# Patient Record
Sex: MALE | HISPANIC OR LATINO | Employment: FULL TIME | URBAN - METROPOLITAN AREA
[De-identification: names, ages, dates, MRNs, and addresses within clinical notes are randomized per-mention and may not be internally consistent; named-entity substitution may affect disease eponyms.]

---

## 2018-05-31 ENCOUNTER — TELEPHONE (OUTPATIENT)
Dept: VASCULAR LAB | Facility: MEDICAL CENTER | Age: 33
End: 2018-05-31

## 2018-05-31 ENCOUNTER — ANTICOAGULATION MONITORING (OUTPATIENT)
Dept: VASCULAR LAB | Facility: MEDICAL CENTER | Age: 33
End: 2018-05-31

## 2018-05-31 RX ORDER — NICOTINE 21 MG/24HR
1 PATCH, TRANSDERMAL 24 HOURS TRANSDERMAL EVERY 24 HOURS
Qty: 30 PATCH | Refills: 6 | Status: SHIPPED | OUTPATIENT
Start: 2018-05-31 | End: 2019-03-20

## 2018-05-31 RX ORDER — VARENICLINE TARTRATE 1 MG/1
1 TABLET, FILM COATED ORAL 2 TIMES DAILY
Qty: 60 TAB | Refills: 3 | Status: SHIPPED | OUTPATIENT
Start: 2018-05-31 | End: 2019-03-20

## 2018-05-31 NOTE — PROGRESS NOTES
Outpatient Pharmacotherapy Program    Smoking Cessation Note    Reason for Visit: Patient presents for smoking cessation pharmacotherapy  Initial visit  HPI:    Age at Which Started Smokin  Average number of cigarettes smoked per day: 1/ ppd x17 years  Additional Smoking Hx:   Pertinent Psych Hx: none  Recent quit attempts:  6 years ago, he became ill URI, quit smoking for 8 months, then restarted on his last visit to [facility]  Medications reviewed.      Vitals:  BP:             HR:       Assessment:    Tobacco use disorder, willing to make quit attempt with a combination of pharmacological and behavioral therapy.    Plan:    Behavioral Modification Recommended: Specifically smoking cessation class - [facility]    Quit Date: 2018    Nicotine Replacement Therapy  Nicoderm 14mg apply one patch every morning then remove at bedtime    Chantix            Starter pack        Potential side-effects of all prescribed pharmacotherapy reviewed with patient who verbalizes understanding of the risks and benefits of this therapy.    Follow-up 2018    Farnaz Mandujano

## 2018-06-15 ENCOUNTER — TELEPHONE (OUTPATIENT)
Dept: VASCULAR LAB | Facility: MEDICAL CENTER | Age: 33
End: 2018-06-15

## 2018-06-15 NOTE — TELEPHONE ENCOUNTER
Prior authorization for Chantix denied  Will scan documents into media tab  Will ask clinical pharmacist to assess options    Michael J. Bloch, MD  [facility]

## 2019-03-20 ENCOUNTER — APPOINTMENT (OUTPATIENT)
Dept: RADIOLOGY | Facility: MEDICAL CENTER | Age: 34
DRG: 872 | End: 2019-03-20
Attending: EMERGENCY MEDICINE
Payer: COMMERCIAL

## 2019-03-20 ENCOUNTER — HOSPITAL ENCOUNTER (INPATIENT)
Facility: MEDICAL CENTER | Age: 34
LOS: 2 days | DRG: 872 | End: 2019-03-22
Attending: EMERGENCY MEDICINE | Admitting: HOSPITALIST
Payer: COMMERCIAL

## 2019-03-20 ENCOUNTER — OFFICE VISIT (OUTPATIENT)
Dept: URGENT CARE | Facility: CLINIC | Age: 34
End: 2019-03-20
Payer: COMMERCIAL

## 2019-03-20 VITALS
HEART RATE: 96 BPM | WEIGHT: 190 LBS | BODY MASS INDEX: 29.82 KG/M2 | HEIGHT: 67 IN | OXYGEN SATURATION: 97 % | TEMPERATURE: 98.5 F | DIASTOLIC BLOOD PRESSURE: 76 MMHG | RESPIRATION RATE: 18 BRPM | SYSTOLIC BLOOD PRESSURE: 108 MMHG

## 2019-03-20 DIAGNOSIS — K04.7 DENTAL ABSCESS: ICD-10-CM

## 2019-03-20 DIAGNOSIS — K04.7 DENTAL INFECTION: ICD-10-CM

## 2019-03-20 DIAGNOSIS — R22.0 RIGHT FACIAL SWELLING: ICD-10-CM

## 2019-03-20 DIAGNOSIS — K08.89 PAIN, DENTAL: ICD-10-CM

## 2019-03-20 PROBLEM — K11.20 SIALOADENITIS OF SUBMANDIBULAR GLAND: Status: ACTIVE | Noted: 2019-03-20

## 2019-03-20 PROBLEM — F17.200 SMOKER: Status: ACTIVE | Noted: 2019-03-20

## 2019-03-20 PROBLEM — A41.9 SEPSIS (HCC): Status: ACTIVE | Noted: 2019-03-20

## 2019-03-20 LAB
ALBUMIN SERPL BCP-MCNC: 4.4 G/DL (ref 3.2–4.9)
ALBUMIN/GLOB SERPL: 1.3 G/DL
ALP SERPL-CCNC: 87 U/L (ref 30–99)
ALT SERPL-CCNC: 18 U/L (ref 2–50)
ANION GAP SERPL CALC-SCNC: 7 MMOL/L (ref 0–11.9)
AST SERPL-CCNC: 21 U/L (ref 12–45)
BASOPHILS # BLD AUTO: 0.2 % (ref 0–1.8)
BASOPHILS # BLD: 0.04 K/UL (ref 0–0.12)
BILIRUB SERPL-MCNC: 0.7 MG/DL (ref 0.1–1.5)
BUN SERPL-MCNC: 9 MG/DL (ref 8–22)
CALCIUM SERPL-MCNC: 9.4 MG/DL (ref 8.5–10.5)
CHLORIDE SERPL-SCNC: 104 MMOL/L (ref 96–112)
CO2 SERPL-SCNC: 24 MMOL/L (ref 20–33)
CREAT SERPL-MCNC: 0.92 MG/DL (ref 0.5–1.4)
EOSINOPHIL # BLD AUTO: 0.05 K/UL (ref 0–0.51)
EOSINOPHIL NFR BLD: 0.3 % (ref 0–6.9)
ERYTHROCYTE [DISTWIDTH] IN BLOOD BY AUTOMATED COUNT: 42.4 FL (ref 35.9–50)
GLOBULIN SER CALC-MCNC: 3.5 G/DL (ref 1.9–3.5)
GLUCOSE SERPL-MCNC: 89 MG/DL (ref 65–99)
HCT VFR BLD AUTO: 47.1 % (ref 42–52)
HGB BLD-MCNC: 15.8 G/DL (ref 14–18)
IMM GRANULOCYTES # BLD AUTO: 0.08 K/UL (ref 0–0.11)
IMM GRANULOCYTES NFR BLD AUTO: 0.5 % (ref 0–0.9)
LYMPHOCYTES # BLD AUTO: 2.34 K/UL (ref 1–4.8)
LYMPHOCYTES NFR BLD: 13.4 % (ref 22–41)
MCH RBC QN AUTO: 30.7 PG (ref 27–33)
MCHC RBC AUTO-ENTMCNC: 33.5 G/DL (ref 33.7–35.3)
MCV RBC AUTO: 91.6 FL (ref 81.4–97.8)
MONOCYTES # BLD AUTO: 1.66 K/UL (ref 0–0.85)
MONOCYTES NFR BLD AUTO: 9.5 % (ref 0–13.4)
NEUTROPHILS # BLD AUTO: 13.28 K/UL (ref 1.82–7.42)
NEUTROPHILS NFR BLD: 76.1 % (ref 44–72)
NRBC # BLD AUTO: 0 K/UL
NRBC BLD-RTO: 0 /100 WBC
PLATELET # BLD AUTO: 248 K/UL (ref 164–446)
PMV BLD AUTO: 9.5 FL (ref 9–12.9)
POTASSIUM SERPL-SCNC: 4 MMOL/L (ref 3.6–5.5)
PROT SERPL-MCNC: 7.9 G/DL (ref 6–8.2)
RBC # BLD AUTO: 5.14 M/UL (ref 4.7–6.1)
SODIUM SERPL-SCNC: 135 MMOL/L (ref 135–145)
WBC # BLD AUTO: 17.5 K/UL (ref 4.8–10.8)

## 2019-03-20 PROCEDURE — 700105 HCHG RX REV CODE 258: Performed by: EMERGENCY MEDICINE

## 2019-03-20 PROCEDURE — 700101 HCHG RX REV CODE 250: Performed by: EMERGENCY MEDICINE

## 2019-03-20 PROCEDURE — 99406 BEHAV CHNG SMOKING 3-10 MIN: CPT | Performed by: HOSPITALIST

## 2019-03-20 PROCEDURE — 700101 HCHG RX REV CODE 250: Performed by: HOSPITALIST

## 2019-03-20 PROCEDURE — 70355 PANORAMIC X-RAY OF JAWS: CPT

## 2019-03-20 PROCEDURE — 700102 HCHG RX REV CODE 250 W/ 637 OVERRIDE(OP): Performed by: HOSPITALIST

## 2019-03-20 PROCEDURE — 700111 HCHG RX REV CODE 636 W/ 250 OVERRIDE (IP): Performed by: EMERGENCY MEDICINE

## 2019-03-20 PROCEDURE — 770006 HCHG ROOM/CARE - MED/SURG/GYN SEMI*

## 2019-03-20 PROCEDURE — 700117 HCHG RX CONTRAST REV CODE 255: Performed by: EMERGENCY MEDICINE

## 2019-03-20 PROCEDURE — 99223 1ST HOSP IP/OBS HIGH 75: CPT | Mod: 25 | Performed by: HOSPITALIST

## 2019-03-20 PROCEDURE — 700111 HCHG RX REV CODE 636 W/ 250 OVERRIDE (IP): Performed by: HOSPITALIST

## 2019-03-20 PROCEDURE — 70487 CT MAXILLOFACIAL W/DYE: CPT

## 2019-03-20 PROCEDURE — 85025 COMPLETE CBC W/AUTO DIFF WBC: CPT

## 2019-03-20 PROCEDURE — A9270 NON-COVERED ITEM OR SERVICE: HCPCS | Performed by: HOSPITALIST

## 2019-03-20 PROCEDURE — 99203 OFFICE O/P NEW LOW 30 MIN: CPT | Performed by: PHYSICIAN ASSISTANT

## 2019-03-20 PROCEDURE — 700105 HCHG RX REV CODE 258: Performed by: HOSPITALIST

## 2019-03-20 PROCEDURE — 80053 COMPREHEN METABOLIC PANEL: CPT

## 2019-03-20 RX ORDER — DEXAMETHASONE SODIUM PHOSPHATE 4 MG/ML
10 INJECTION, SOLUTION INTRA-ARTICULAR; INTRALESIONAL; INTRAMUSCULAR; INTRAVENOUS; SOFT TISSUE ONCE
Status: COMPLETED | OUTPATIENT
Start: 2019-03-20 | End: 2019-03-20

## 2019-03-20 RX ORDER — ONDANSETRON 2 MG/ML
4 INJECTION INTRAMUSCULAR; INTRAVENOUS ONCE
Status: COMPLETED | OUTPATIENT
Start: 2019-03-20 | End: 2019-03-20

## 2019-03-20 RX ORDER — KETOROLAC TROMETHAMINE 30 MG/ML
30 INJECTION, SOLUTION INTRAMUSCULAR; INTRAVENOUS EVERY 6 HOURS PRN
Status: DISCONTINUED | OUTPATIENT
Start: 2019-03-20 | End: 2019-03-22

## 2019-03-20 RX ORDER — PROMETHAZINE HYDROCHLORIDE 12.5 MG/1
12.5-25 SUPPOSITORY RECTAL EVERY 4 HOURS PRN
Status: DISCONTINUED | OUTPATIENT
Start: 2019-03-20 | End: 2019-03-22 | Stop reason: HOSPADM

## 2019-03-20 RX ORDER — DEXAMETHASONE SODIUM PHOSPHATE 4 MG/ML
6 INJECTION, SOLUTION INTRA-ARTICULAR; INTRALESIONAL; INTRAMUSCULAR; INTRAVENOUS; SOFT TISSUE EVERY 6 HOURS
Status: DISCONTINUED | OUTPATIENT
Start: 2019-03-20 | End: 2019-03-22 | Stop reason: HOSPADM

## 2019-03-20 RX ORDER — ONDANSETRON 4 MG/1
4 TABLET, ORALLY DISINTEGRATING ORAL EVERY 4 HOURS PRN
Status: DISCONTINUED | OUTPATIENT
Start: 2019-03-20 | End: 2019-03-22 | Stop reason: HOSPADM

## 2019-03-20 RX ORDER — SODIUM CHLORIDE 9 MG/ML
1000 INJECTION, SOLUTION INTRAVENOUS ONCE
Status: COMPLETED | OUTPATIENT
Start: 2019-03-20 | End: 2019-03-20

## 2019-03-20 RX ORDER — SODIUM CHLORIDE 9 MG/ML
INJECTION, SOLUTION INTRAVENOUS CONTINUOUS
Status: DISCONTINUED | OUTPATIENT
Start: 2019-03-20 | End: 2019-03-22

## 2019-03-20 RX ORDER — AMOXICILLIN 250 MG
2 CAPSULE ORAL 2 TIMES DAILY
Status: DISCONTINUED | OUTPATIENT
Start: 2019-03-20 | End: 2019-03-22 | Stop reason: HOSPADM

## 2019-03-20 RX ORDER — BISACODYL 10 MG
10 SUPPOSITORY, RECTAL RECTAL
Status: DISCONTINUED | OUTPATIENT
Start: 2019-03-20 | End: 2019-03-22 | Stop reason: HOSPADM

## 2019-03-20 RX ORDER — POLYETHYLENE GLYCOL 3350 17 G/17G
1 POWDER, FOR SOLUTION ORAL
Status: DISCONTINUED | OUTPATIENT
Start: 2019-03-20 | End: 2019-03-22 | Stop reason: HOSPADM

## 2019-03-20 RX ORDER — MORPHINE SULFATE 4 MG/ML
4 INJECTION, SOLUTION INTRAMUSCULAR; INTRAVENOUS ONCE
Status: COMPLETED | OUTPATIENT
Start: 2019-03-20 | End: 2019-03-20

## 2019-03-20 RX ORDER — CEPHALEXIN 500 MG/1
500 CAPSULE ORAL 4 TIMES DAILY
Status: ON HOLD | COMMUNITY
End: 2019-03-22

## 2019-03-20 RX ORDER — PROMETHAZINE HYDROCHLORIDE 25 MG/1
12.5-25 TABLET ORAL EVERY 4 HOURS PRN
Status: DISCONTINUED | OUTPATIENT
Start: 2019-03-20 | End: 2019-03-22 | Stop reason: HOSPADM

## 2019-03-20 RX ORDER — CLINDAMYCIN PHOSPHATE 900 MG/50ML
900 INJECTION, SOLUTION INTRAVENOUS ONCE
Status: COMPLETED | OUTPATIENT
Start: 2019-03-20 | End: 2019-03-20

## 2019-03-20 RX ORDER — TRAMADOL HYDROCHLORIDE 50 MG/1
50 TABLET ORAL EVERY 6 HOURS PRN
Status: DISCONTINUED | OUTPATIENT
Start: 2019-03-20 | End: 2019-03-22 | Stop reason: HOSPADM

## 2019-03-20 RX ORDER — CLINDAMYCIN PHOSPHATE 600 MG/50ML
600 INJECTION, SOLUTION INTRAVENOUS EVERY 8 HOURS
Status: DISCONTINUED | OUTPATIENT
Start: 2019-03-20 | End: 2019-03-21

## 2019-03-20 RX ORDER — ONDANSETRON 2 MG/ML
4 INJECTION INTRAMUSCULAR; INTRAVENOUS EVERY 4 HOURS PRN
Status: DISCONTINUED | OUTPATIENT
Start: 2019-03-20 | End: 2019-03-22 | Stop reason: HOSPADM

## 2019-03-20 RX ORDER — ACETAMINOPHEN 325 MG/1
650 TABLET ORAL EVERY 6 HOURS PRN
Status: DISCONTINUED | OUTPATIENT
Start: 2019-03-20 | End: 2019-03-22 | Stop reason: HOSPADM

## 2019-03-20 RX ADMIN — DEXAMETHASONE SODIUM PHOSPHATE 6 MG: 4 INJECTION, SOLUTION INTRAMUSCULAR; INTRAVENOUS at 18:02

## 2019-03-20 RX ADMIN — TRAMADOL HYDROCHLORIDE 50 MG: 50 TABLET, FILM COATED ORAL at 18:02

## 2019-03-20 RX ADMIN — ONDANSETRON 4 MG: 2 INJECTION INTRAMUSCULAR; INTRAVENOUS at 14:21

## 2019-03-20 RX ADMIN — IOHEXOL 80 ML: 350 INJECTION, SOLUTION INTRAVENOUS at 14:05

## 2019-03-20 RX ADMIN — PHENOL 1 SPRAY: 1.5 LIQUID ORAL at 18:02

## 2019-03-20 RX ADMIN — SODIUM CHLORIDE 1000 ML: 9 INJECTION, SOLUTION INTRAVENOUS at 14:23

## 2019-03-20 RX ADMIN — DEXAMETHASONE SODIUM PHOSPHATE 10 MG: 4 INJECTION, SOLUTION INTRA-ARTICULAR; INTRALESIONAL; INTRAMUSCULAR; INTRAVENOUS; SOFT TISSUE at 15:53

## 2019-03-20 RX ADMIN — CLINDAMYCIN IN 5 PERCENT DEXTROSE 600 MG: 12 INJECTION, SOLUTION INTRAVENOUS at 21:04

## 2019-03-20 RX ADMIN — DEXAMETHASONE SODIUM PHOSPHATE 6 MG: 4 INJECTION, SOLUTION INTRAMUSCULAR; INTRAVENOUS at 23:20

## 2019-03-20 RX ADMIN — KETOROLAC TROMETHAMINE 30 MG: 30 INJECTION, SOLUTION INTRAMUSCULAR; INTRAVENOUS at 17:10

## 2019-03-20 RX ADMIN — MORPHINE SULFATE 4 MG: 4 INJECTION INTRAVENOUS at 14:21

## 2019-03-20 RX ADMIN — SODIUM CHLORIDE: 9 INJECTION, SOLUTION INTRAVENOUS at 17:10

## 2019-03-20 RX ADMIN — ACETAMINOPHEN 650 MG: 325 TABLET, FILM COATED ORAL at 16:53

## 2019-03-20 RX ADMIN — CLINDAMYCIN IN 5 PERCENT DEXTROSE 900 MG: 18 INJECTION, SOLUTION INTRAVENOUS at 14:24

## 2019-03-20 RX ADMIN — KETOROLAC TROMETHAMINE 30 MG: 30 INJECTION, SOLUTION INTRAMUSCULAR; INTRAVENOUS at 23:19

## 2019-03-20 RX ADMIN — PHENOL 1 SPRAY: 1.5 LIQUID ORAL at 21:10

## 2019-03-20 ASSESSMENT — ENCOUNTER SYMPTOMS
DIARRHEA: 0
EYE PAIN: 0
PALPITATIONS: 0
BLURRED VISION: 0
TINGLING: 0
PALPITATIONS: 0
HEADACHES: 0
NECK PAIN: 0
SINUS PAIN: 0
DOUBLE VISION: 0
COUGH: 0
DIZZINESS: 0
CHILLS: 1
CONSTIPATION: 0
BACK PAIN: 0
STRIDOR: 0
BLURRED VISION: 0
ORTHOPNEA: 0
SINUS PAIN: 1
NECK PAIN: 1
HEMOPTYSIS: 0
HEARTBURN: 0
ABDOMINAL PAIN: 0
CHILLS: 1
SINUS PRESSURE: 1
SPUTUM PRODUCTION: 0
DEPRESSION: 0
FEVER: 1
WEAKNESS: 0
NAUSEA: 0
MYALGIAS: 0
SORE THROAT: 1
FEVER: 1
HEADACHES: 0
VOMITING: 0
DIZZINESS: 0
ABDOMINAL PAIN: 0
BRUISES/BLEEDS EASILY: 0
NAUSEA: 0
MYALGIAS: 0
VOMITING: 0

## 2019-03-20 ASSESSMENT — COPD QUESTIONNAIRES
HAVE YOU SMOKED AT LEAST 100 CIGARETTES IN YOUR ENTIRE LIFE: YES
DURING THE PAST 4 WEEKS HOW MUCH DID YOU FEEL SHORT OF BREATH: NONE/LITTLE OF THE TIME
DO YOU EVER COUGH UP ANY MUCUS OR PHLEGM?: NO/ONLY WITH OCCASIONAL COLDS OR INFECTIONS
COPD SCREENING SCORE: 2

## 2019-03-20 ASSESSMENT — COGNITIVE AND FUNCTIONAL STATUS - GENERAL
DAILY ACTIVITIY SCORE: 24
MOBILITY SCORE: 24
SUGGESTED CMS G CODE MODIFIER MOBILITY: CH
SUGGESTED CMS G CODE MODIFIER DAILY ACTIVITY: CH

## 2019-03-20 ASSESSMENT — LIFESTYLE VARIABLES
ALCOHOL_USE: NO
EVER_SMOKED: NEVER
EVER_SMOKED: YES

## 2019-03-20 ASSESSMENT — PATIENT HEALTH QUESTIONNAIRE - PHQ9
2. FEELING DOWN, DEPRESSED, IRRITABLE, OR HOPELESS: NOT AT ALL
SUM OF ALL RESPONSES TO PHQ9 QUESTIONS 1 AND 2: 0
1. LITTLE INTEREST OR PLEASURE IN DOING THINGS: NOT AT ALL

## 2019-03-20 NOTE — ED TRIAGE NOTES
Chief Complaint   Patient presents with   • Dental Pain     Pt reports dental pain/swelling on Mon, pt now with increased swelling/ pain to right side jaw/neck. pt having a difficult time swallowing/controlling secretions.    • Oral Swelling   • Difficulty Swallowing   • Neck Swelling     Explained to pt triage process, made pt aware to tell this RN/staff of any changes/concerns, pt verbalized understanding of process and instructions given. Pt to ER lobby.

## 2019-03-20 NOTE — PROGRESS NOTES
Subjective:      Jorden Moses is a 33 y.o. male who presents with Oral Swelling (X2 days Right side mouth swelling)      Dental Pain    This is a new problem. The current episode started in the past 7 days. The problem occurs constantly. The problem has been rapidly worsening. The pain is severe. Associated symptoms include difficulty swallowing, facial pain, a fever and sinus pressure. Treatments tried: Keflex. The treatment provided no relief.   Patient reports that he has an issue with his right molar for which his dentist put him on Keflex for days ago.  He says that swelling and pain has been rapidly worsening, however now he is unable to open his mouth.  He had subjective fever yesterday and his wife said he was having chills in conjunction with it.  He says that his neck is also becoming very painful mainly in the front.      Review of Systems   Constitutional: Positive for chills, fever and malaise/fatigue.   HENT: Positive for sinus pain and sinus pressure.         Dental pain, facial pain   Eyes: Negative for blurred vision and pain.   Cardiovascular: Negative for chest pain and palpitations.   Gastrointestinal: Negative for abdominal pain, constipation, diarrhea, nausea and vomiting.   Musculoskeletal: Positive for neck pain. Negative for myalgias.   Neurological: Negative for dizziness and headaches.       PMH:  has a past medical history of Open wound of finger (4/6/2015).  MEDS:   Current Outpatient Prescriptions:   •  cephALEXin (KEFLEX) 500 MG Cap, Take 500 mg by mouth 4 times a day., Disp: , Rfl:   •  varenicline (CHANTIX CONTINUING MONTH TONIA) 1 MG tablet, Take 1 Tab by mouth 2 times a day. (Patient not taking: Reported on 3/20/2019), Disp: 60 Tab, Rfl: 3  •  nicotine (NICODERM CQ) 14 MG/24HR PATCH 24 HR, Apply 1 Patch to skin as directed every 24 hours. (Patient not taking: Reported on 3/20/2019), Disp: 30 Patch, Rfl: 6  •  sucralfate (CARAFATE) 1 GM Tab, Take 1 Tab by mouth 4 Times a  "Day,Before Meals and at Bedtime. take one hour before meals and at bedtime.  Take it for 3 days then as needed for pain.  Can dissolve in water (3oz) before ingestion (Patient not taking: Reported on 3/20/2019), Disp: 20 Tab, Rfl: 0  •  oxycodone-acetaminophen (PERCOCET) 5-325 MG TABS, Take 1-2 Tabs by mouth every four hours as needed. (Patient not taking: Reported on 3/20/2019), Disp: 20 Tab, Rfl: 0  ALLERGIES: No Known Allergies  SURGHX: History reviewed. No pertinent surgical history.  SOCHX:  reports that he has been smoking.  He has never used smokeless tobacco. He reports that he drinks alcohol. He reports that he does not use drugs.  FH: Family history was reviewed, no pertinent findings to report     Objective:     /76 (BP Location: Left arm, Patient Position: Sitting, BP Cuff Size: Adult)   Pulse 96   Temp 36.9 °C (98.5 °F) (Temporal)   Resp 18   Ht 1.702 m (5' 7\")   Wt 86.2 kg (190 lb)   SpO2 97%   BMI 29.76 kg/m²      Physical Exam   Constitutional: He is oriented to person, place, and time. He appears well-developed and well-nourished.   HENT:   Head: Normocephalic and atraumatic.   Right Ear: External ear normal.   Left Ear: External ear normal.   Mouth/Throat: There is trismus in the jaw. Abnormal dentition. Dental abscesses present.   Patient is unable to fully open his mouth due to pain and swelling.  There is significant swelling of the right cheek extending into the right side of the neck.   Eyes: Pupils are equal, round, and reactive to light. Conjunctivae are normal.   Neck: Normal range of motion.   Cardiovascular: Normal rate, regular rhythm and normal heart sounds.    No murmur heard.  Pulmonary/Chest: Effort normal and breath sounds normal. He has no wheezes.   Lymphadenopathy:     He has no cervical adenopathy.   Neurological: He is alert and oriented to person, place, and time.   Skin: Skin is warm and dry. Capillary refill takes less than 2 seconds.   Psychiatric: He has a " normal mood and affect. His behavior is normal. Judgment normal.   Vitals reviewed.         Assessment/Plan:     1. Dental infection    2. Pain, dental    3. Right facial swelling      Discussed with patient that due to how much swelling there is and how rapidly he is progressing, with the fact that he is unable to open his mouth for examination I believe he warrants further evaluation in the emergency department for CT scan and potential ENT or oral surgeon consultation.  Advised patient to go to [facility].  He is stable at time of discharge from the urgent care.  His wife is going to drive him by private vehicle.  Attempted to reach the care coordinator at the hospital but no one answered.

## 2019-03-20 NOTE — H&P
[facility] Medicine History & Physical Note    Date of Service  3/20/2019    Primary Care Physician  Pcp Pt States None    Consultants  None    Code Status  Full code    Chief Complaint  Swelling of right neck    History of Presenting Illness  33 y.o. male who presented 3/20/2019 with no significant past medical history, is coming today complaining of swelling on his right neck started around 3 days ago, and beginning patient thought that he had some tooth infection, but swelling got worse patient developed malaise patient was seen as outpatient and he was prescribed NSAIDs and Keflex but did not work, patient came to the emergency room today had CT showingRight submandibular sialoadenitis without abscess, patient was started on IV clindamycin and IV Decadron, patient when I saw him he was in the emergency room, he was able to speak but has pain with speaking and swallowing, he denies any shortness of breath, no chest pain palpitation, he is able to drink fluids, family is at bedside, denies any abdominal pain nausea vomiting diarrhea or constipation, patient denies any neck pain, headaches, no vision problems, he is alert oriented follows commands and he is able to give history, patient expressed understanding of his plan of care and agree with it, all questions answered.    Review of Systems  Review of Systems   Constitutional: Positive for chills and fever.   HENT: Positive for sore throat. Negative for congestion and sinus pain.    Eyes: Negative for blurred vision and double vision.   Respiratory: Negative for cough, hemoptysis, sputum production and stridor.    Cardiovascular: Negative for chest pain, palpitations and orthopnea.   Gastrointestinal: Negative for abdominal pain, heartburn, nausea and vomiting.   Genitourinary: Negative for dysuria, frequency and urgency.   Musculoskeletal: Negative for back pain, myalgias and neck pain.   Skin: Negative for itching.   Neurological: Negative for dizziness,  tingling, weakness and headaches.   Endo/Heme/Allergies: Does not bruise/bleed easily.   Psychiatric/Behavioral: Negative for depression and suicidal ideas.       Past Medical History   has a past medical history of Open wound of finger (4/6/2015).    Surgical History  No recent surgeries    Family History  Family history reviewed, noncontributory    Social History   reports that he has been smoking.  He has never used smokeless tobacco. He reports that he drinks alcohol. He reports that he does not use drugs.    Allergies  No Known Allergies    Medications  Prior to Admission Medications   Prescriptions Last Dose Informant Patient Reported? Taking?   cephALEXin (KEFLEX) 500 MG Cap 3/20/2019 at 0400  Yes No   Sig: Take 500 mg by mouth 4 times a day.      Facility-Administered Medications: None       Physical Exam  Temp:  [36.3 °C (97.4 °F)] 36.3 °C (97.4 °F)  Pulse:  [98] 98  Resp:  [12] 12  BP: (109)/(77) 109/77  SpO2:  [96 %] 96 %    Physical Exam   Constitutional: He is oriented to person, place, and time. He appears well-nourished. No distress.   HENT:   Head: Atraumatic. Macrocephalic.   Swelling on right submandibular area, mild tenderness on palpation, no crepitus, no stridor.   Eyes: Pupils are equal, round, and reactive to light. Conjunctivae are normal. Right eye exhibits no discharge. Left eye exhibits no discharge. No scleral icterus.   Neck: Normal range of motion. Neck supple.   Swelling on right submandibular area   Cardiovascular: Regular rhythm and normal heart sounds.    Pulmonary/Chest: Effort normal and breath sounds normal. No stridor. No respiratory distress. He has no wheezes. He has no rales.   Abdominal: Soft. Bowel sounds are normal. He exhibits no distension. There is no tenderness. There is no rebound.   Musculoskeletal: Normal range of motion. He exhibits no tenderness.   Lymphadenopathy:     He has cervical adenopathy.   Neurological: He is alert and oriented to person, place, and time.  He exhibits normal muscle tone.   Skin: Skin is dry. No erythema.   Psychiatric: He has a normal mood and affect.   Nursing note and vitals reviewed.      Laboratory:  Recent Labs      03/20/19   1330   WBC  17.5*   RBC  5.14   HEMOGLOBIN  15.8   HEMATOCRIT  47.1   MCV  91.6   MCH  30.7   MCHC  33.5*   RDW  42.4   PLATELETCT  248   MPV  9.5     Recent Labs      03/20/19   1330   SODIUM  135   POTASSIUM  4.0   CHLORIDE  104   CO2  24   GLUCOSE  89   BUN  9   CREATININE  0.92   CALCIUM  9.4     Recent Labs      03/20/19   1330   ALTSGPT  18   ASTSGOT  21   ALKPHOSPHAT  87   TBILIRUBIN  0.7   GLUCOSE  89                 No results for input(s): TROPONINI in the last 72 hours.    Urinalysis:    No results found     Imaging:  CT-MAXILLOFACIAL WITH PLUS RECONS   Final Result      Right submandibular sialoadenitis without abscess      [id number]   Final Result      No acute fracture of the mandible.            Assessment/Plan:  I anticipate this patient will require at least two midnights for appropriate medical management, necessitating inpatient admission.    Smoker- (present on admission)   Assessment & Plan    Patient advised and counseled regarding smoking cessation, patient is stated that he is willing to quit at this time, he was advised about different alternatives that can help him quitting.  Spent more than 3 minutes counseling patient.     Sepsis (HCC)- (present on admission)   Assessment & Plan    This is sepsis (without associated acute organ dysfunction). due to Right submandibular sialoadenitis without abscess, continue IV antibiotics, follow-up blood cultures, IV fluids.     Sialoadenitis of submandibular gland- (present on admission)   Assessment & Plan    Right-sided, CT showed Right submandibular sialoadenitis without abscess, at this time airway is patent but will need close monitoring, patient is able to speak and swallow, denies any shortness of breath, will continue on IV steroids, IV  antibiotics, follow-up cultures, pending on evolution will consider ENT consult.         VTE prophylaxis: SCDs

## 2019-03-20 NOTE — ED PROVIDER NOTES
"ED Provider Note      CHIEF COMPLAINT  Chief Complaint   Patient presents with   • Dental Pain     Pt reports dental pain/swelling on Mon, pt now with increased swelling/ pain to right side jaw/neck. pt having a difficult time swallowing/controlling secretions.    • Oral Swelling   • Difficulty Swallowing   • Neck Swelling       HPI  Jorden Moses is a 33 y.o. male who presents with dental pain. Pain started 3 days ago. Pain is located right jaw. Pain is severe. Constant. It is getting worse. No fevers. No neck stiffness or headache. Swelling has progressed and now it is under the tongue.  It is difficult for him to open and close the mouth.  Difficult to swallow.    REVIEW OF SYSTEMS    See HPI, all other systems reviewed and negative    PAST MEDICAL HISTORY  Past Medical History:   Diagnosis Date   • Open wound of finger 4/6/2015       SOCIAL HISTORY  Social History   Substance Use Topics   • Smoking status: Current Every Day Smoker   • Smokeless tobacco: Never Used   • Alcohol use Yes      Comment: occasionally       ALLERGIES  No Known Allergies    PHYSICAL EXAM  VITAL SIGNS: /77   Pulse 98   Temp 36.3 °C (97.4 °F) (Temporal)   Resp 12   Ht 1.702 m (5' 7\")   Wt 87 kg (191 lb 12.8 oz)   SpO2 96%   BMI 30.04 kg/m²   Constitutional: Well developed, Well nourished, No acute distress, Non-toxic appearance.   HENT:  Atraumatic, Normocephalic. Bilateral external ears normal, immune swelling over the right mandible and submental space.  There is swelling underneath the tongue intraorally.  Tenderness over the right gingiva without discrete dental carry  Eyes: Grossly Normal inspection  Neck: Normal range of motion  Cardiovascular: Normal heart rate  Thorax & Lungs: No respiratory distress  Skin: No rash.     COURSE & MEDICAL DECISION MAKING  Patient with swelling underneath the right jaw and  submental space concerning for Isaias's angina.  An IV was established.  He is given intravenous " clindamycin.  Laboratory data was ordered and a maxillofacial CT scan along with panoramic x-ray was ordered.    X-ray returned negative    Laboratory data as noted    CT scan demonstrates right-sided sialoadenitis.  Given the degree of swelling I still believe patient requires admission to the hospital for IV antibiotics.  We will try to obtain sialagogues.  I have ordered Decadron 10 mg IV.  Considered otolaryngology consultation however there is no abscess or need for immediate surgical intervention.  I consulted the hospitalist who is agreeable to admission.    FINAL IMPRESSION  1.  Right submandibular sialoadenitis  2.  Submental and sub-lingual edema associated with #1      This dictation was created using voice recognition software. The accuracy of the dictation is limited to the abilities of the software.   The nursing notes were reviewed and certain aspects of this information were incorporated into this note.      Electronically signed by: Jd Alonso, 3/20/2019 1:36 PM

## 2019-03-20 NOTE — ED NOTES
Med rec updated and complete.  Allergies reviewed.  Pt is currently on a antibiotic   Keflex 03/19/19 ( start date).  Last dose 0400 on 03/20/19.

## 2019-03-20 NOTE — ED NOTES
Patient stating that pain in his jaw is only slightly improved after morphine, and that his headache I still at the same pain level

## 2019-03-21 LAB
ANION GAP SERPL CALC-SCNC: 11 MMOL/L (ref 0–11.9)
BUN SERPL-MCNC: 10 MG/DL (ref 8–22)
CALCIUM SERPL-MCNC: 9.1 MG/DL (ref 8.5–10.5)
CHLORIDE SERPL-SCNC: 108 MMOL/L (ref 96–112)
CO2 SERPL-SCNC: 21 MMOL/L (ref 20–33)
CREAT SERPL-MCNC: 0.74 MG/DL (ref 0.5–1.4)
ERYTHROCYTE [DISTWIDTH] IN BLOOD BY AUTOMATED COUNT: 42.8 FL (ref 35.9–50)
GLUCOSE SERPL-MCNC: 141 MG/DL (ref 65–99)
HCT VFR BLD AUTO: 44.7 % (ref 42–52)
HGB BLD-MCNC: 14.6 G/DL (ref 14–18)
MCH RBC QN AUTO: 30.6 PG (ref 27–33)
MCHC RBC AUTO-ENTMCNC: 32.7 G/DL (ref 33.7–35.3)
MCV RBC AUTO: 93.7 FL (ref 81.4–97.8)
PLATELET # BLD AUTO: 243 K/UL (ref 164–446)
PMV BLD AUTO: 9.8 FL (ref 9–12.9)
POTASSIUM SERPL-SCNC: 4.1 MMOL/L (ref 3.6–5.5)
RBC # BLD AUTO: 4.77 M/UL (ref 4.7–6.1)
SODIUM SERPL-SCNC: 140 MMOL/L (ref 135–145)
WBC # BLD AUTO: 14.4 K/UL (ref 4.8–10.8)

## 2019-03-21 PROCEDURE — 80048 BASIC METABOLIC PNL TOTAL CA: CPT

## 2019-03-21 PROCEDURE — 700101 HCHG RX REV CODE 250: Performed by: HOSPITALIST

## 2019-03-21 PROCEDURE — A9270 NON-COVERED ITEM OR SERVICE: HCPCS | Performed by: HOSPITALIST

## 2019-03-21 PROCEDURE — 700111 HCHG RX REV CODE 636 W/ 250 OVERRIDE (IP): Performed by: INTERNAL MEDICINE

## 2019-03-21 PROCEDURE — 99285 EMERGENCY DEPT VISIT HI MDM: CPT

## 2019-03-21 PROCEDURE — 700105 HCHG RX REV CODE 258: Performed by: HOSPITALIST

## 2019-03-21 PROCEDURE — 96365 THER/PROPH/DIAG IV INF INIT: CPT

## 2019-03-21 PROCEDURE — 85027 COMPLETE CBC AUTOMATED: CPT

## 2019-03-21 PROCEDURE — 700102 HCHG RX REV CODE 250 W/ 637 OVERRIDE(OP): Performed by: HOSPITALIST

## 2019-03-21 PROCEDURE — 700105 HCHG RX REV CODE 258: Performed by: INTERNAL MEDICINE

## 2019-03-21 PROCEDURE — 700111 HCHG RX REV CODE 636 W/ 250 OVERRIDE (IP): Performed by: HOSPITALIST

## 2019-03-21 PROCEDURE — 36415 COLL VENOUS BLD VENIPUNCTURE: CPT

## 2019-03-21 PROCEDURE — 770006 HCHG ROOM/CARE - MED/SURG/GYN SEMI*

## 2019-03-21 PROCEDURE — 96375 TX/PRO/DX INJ NEW DRUG ADDON: CPT

## 2019-03-21 PROCEDURE — 99232 SBSQ HOSP IP/OBS MODERATE 35: CPT | Performed by: INTERNAL MEDICINE

## 2019-03-21 RX ADMIN — DEXAMETHASONE SODIUM PHOSPHATE 6 MG: 4 INJECTION, SOLUTION INTRAMUSCULAR; INTRAVENOUS at 04:46

## 2019-03-21 RX ADMIN — CLINDAMYCIN IN 5 PERCENT DEXTROSE 600 MG: 12 INJECTION, SOLUTION INTRAVENOUS at 04:46

## 2019-03-21 RX ADMIN — TRAMADOL HYDROCHLORIDE 50 MG: 50 TABLET, FILM COATED ORAL at 04:44

## 2019-03-21 RX ADMIN — PHENOL 1 SPRAY: 1.5 LIQUID ORAL at 04:48

## 2019-03-21 RX ADMIN — SODIUM CHLORIDE: 9 INJECTION, SOLUTION INTRAVENOUS at 04:45

## 2019-03-21 RX ADMIN — KETOROLAC TROMETHAMINE 30 MG: 30 INJECTION, SOLUTION INTRAMUSCULAR; INTRAVENOUS at 21:12

## 2019-03-21 RX ADMIN — DEXAMETHASONE SODIUM PHOSPHATE 6 MG: 4 INJECTION, SOLUTION INTRAMUSCULAR; INTRAVENOUS at 17:47

## 2019-03-21 RX ADMIN — AMPICILLIN SODIUM AND SULBACTAM SODIUM 3 G: 2; 1 INJECTION, POWDER, FOR SOLUTION INTRAMUSCULAR; INTRAVENOUS at 17:47

## 2019-03-21 RX ADMIN — DEXAMETHASONE SODIUM PHOSPHATE 6 MG: 4 INJECTION, SOLUTION INTRAMUSCULAR; INTRAVENOUS at 12:54

## 2019-03-21 RX ADMIN — AMPICILLIN SODIUM AND SULBACTAM SODIUM 3 G: 2; 1 INJECTION, POWDER, FOR SOLUTION INTRAMUSCULAR; INTRAVENOUS at 12:55

## 2019-03-21 RX ADMIN — SODIUM CHLORIDE: 9 INJECTION, SOLUTION INTRAVENOUS at 17:48

## 2019-03-21 ASSESSMENT — ENCOUNTER SYMPTOMS
NAUSEA: 0
WEAKNESS: 1
COUGH: 0
SPEECH CHANGE: 0
MEMORY LOSS: 0
FLANK PAIN: 0
FOCAL WEAKNESS: 0
VOMITING: 0
MYALGIAS: 1
DEPRESSION: 0
ABDOMINAL PAIN: 0
DIZZINESS: 0
NERVOUS/ANXIOUS: 0
DIAPHORESIS: 0
SENSORY CHANGE: 0
HEADACHES: 0
SHORTNESS OF BREATH: 0
PALPITATIONS: 0
BACK PAIN: 0
CHILLS: 0
FEVER: 0

## 2019-03-21 ASSESSMENT — PATIENT HEALTH QUESTIONNAIRE - PHQ9
1. LITTLE INTEREST OR PLEASURE IN DOING THINGS: NOT AT ALL
SUM OF ALL RESPONSES TO PHQ9 QUESTIONS 1 AND 2: 0
2. FEELING DOWN, DEPRESSED, IRRITABLE, OR HOPELESS: NOT AT ALL

## 2019-03-21 NOTE — PROGRESS NOTES
"Bedside report received. Family members at bedside, pt is A&Ox4, 2 RN skin check was completed. Pt educated regarding medications IV clindamycin and IV Decadron. Pt denies need for pain medication at this time, PRN medications Tramadol and Toradol were administered per MAR. \"pt stated that medication really took care of the pain\" Pt was offered chloraseptic oral throat spray for soar throat. POC discussed with pt; all questions answered at this time.   "

## 2019-03-21 NOTE — ASSESSMENT & PLAN NOTE
This is sepsis (without associated acute organ dysfunction). due to Right submandibular sialoadenitis without abscess, continue IV antibiotics, follow-up blood cultures, IV fluids.    3/21 resolving

## 2019-03-21 NOTE — PROGRESS NOTES
· 2 RN skin check complete with YOU Cameron.  · Devices in place N/A.  · Skin assessed under devices N/A.  · Confirmed pressure ulcers found on N/A  · New potential pressure ulcers noted on N/A.  · Skin assessment completed and pt had no areas of concern. All skin is blanching and intact. Pt does have redness and swelling underneath the tongue. The Right mandible is red and tender due to edema. Oral soar throat spray administered to mouth area.

## 2019-03-21 NOTE — PROGRESS NOTES
Assumed care of patient at change of shift. Patient is alert and oriented x 4. R side of face/jaw still swollen but patient states less painful today and has not required any pain medication or throat spray. Advanced diet to full liquid and patient is tolerating well. Wife brought in sour gummy candy for patient to help increase salvia secretions to help with infection. IVF, antibiotics and steroids continue. Will continue to monitor. Safety measures in place. Call light and belongings within reach.

## 2019-03-21 NOTE — CARE PLAN
Problem: Knowledge Deficit  Goal: Knowledge of disease process/condition, treatment plan, diagnostic tests, and medications will improve  Outcome: PROGRESSING AS EXPECTED  Pt educated regarding plan of care and medications. All questions answered.     Problem: Pain Management  Goal: Pain level will decrease to patient's comfort goal  Outcome: PROGRESSING AS EXPECTED  Pt assessed for pain regularly and medicated PRN per MAR.

## 2019-03-21 NOTE — PROGRESS NOTES
[facility] Medicine Daily Progress Note    Date of Service  3/21/2019    Chief Complaint  33 y.o. male admitted 3/20/2019 with history of tobacco use admitted for right sided sialoadenitis, currently on antibiotics and steroids.    Hospital Course    33 y.o. male admitted 3/20/2019 with history of tobacco use admitted for right sided sialoadenitis, currently on antibiotics and steroids.      Interval Problem Update  Improving R mandibular edema and ttp  Able to swallow  No respiratory difficulty    Consultants/Specialty  none    Code Status  Full    Disposition  TBD    Review of Systems  Review of Systems   Constitutional: Negative for chills, diaphoresis, fever and malaise/fatigue.   HENT: Negative for congestion and hearing loss.         Right facial edema, ttp   Respiratory: Negative for cough and shortness of breath.    Cardiovascular: Negative for chest pain, palpitations and leg swelling.   Gastrointestinal: Negative for abdominal pain, nausea and vomiting.   Genitourinary: Negative for dysuria and flank pain.   Musculoskeletal: Positive for myalgias. Negative for back pain and joint pain.   Neurological: Positive for weakness. Negative for dizziness, sensory change, speech change, focal weakness and headaches.   Psychiatric/Behavioral: Negative for depression and memory loss. The patient is not nervous/anxious.         Physical Exam  Temp:  [36.3 °C (97.3 °F)-38.4 °C (101.1 °F)] 36.8 °C (98.3 °F)  Pulse:  [] 96  Resp:  [17-18] 18  BP: (100-117)/(59-75) 103/59  SpO2:  [93 %-98 %] 94 %    Physical Exam   Constitutional: He is oriented to person, place, and time. He appears well-nourished. No distress.   HENT:   Head: Normocephalic and atraumatic.       Nose: Nose normal.   Mouth/Throat: No oropharyngeal exudate.   Eyes: Pupils are equal, round, and reactive to light. EOM are normal. Right eye exhibits no discharge. Left eye exhibits no discharge.   Neck: Neck supple. No JVD present. No thyromegaly present.    Cardiovascular: Normal rate and intact distal pulses.    No murmur heard.  Pulmonary/Chest: Effort normal and breath sounds normal. No respiratory distress. He has no wheezes.   Abdominal: Soft. Bowel sounds are normal. He exhibits no distension. There is no tenderness.   Musculoskeletal: He exhibits no edema or tenderness.   Neurological: He is alert and oriented to person, place, and time. No cranial nerve deficit. He exhibits normal muscle tone. Coordination normal.   Skin: Skin is warm and dry. No rash noted. He is not diaphoretic. No erythema.   Psychiatric: He has a normal mood and affect. His behavior is normal. Judgment and thought content normal.   Nursing note and vitals reviewed.      Fluids    Intake/Output Summary (Last 24 hours) at 03/21/19 1358  Last data filed at 03/21/19 0900   Gross per 24 hour   Intake             2446 ml   Output                0 ml   Net             2446 ml       Laboratory  Recent Labs      03/20/19   1330  03/21/19   0241   WBC  17.5*  14.4*   RBC  5.14  4.77   HEMOGLOBIN  15.8  14.6   HEMATOCRIT  47.1  44.7   MCV  91.6  93.7   MCH  30.7  30.6   MCHC  33.5*  32.7*   RDW  42.4  42.8   PLATELETCT  248  243   MPV  9.5  9.8     Recent Labs      03/20/19   1330  03/21/19   0241   SODIUM  135  140   POTASSIUM  4.0  4.1   CHLORIDE  104  108   CO2  24  21   GLUCOSE  89  141*   BUN  9  10   CREATININE  0.92  0.74   CALCIUM  9.4  9.1                   Imaging  CT-MAXILLOFACIAL WITH PLUS RECONS   Final Result      Right submandibular sialoadenitis without abscess      [id number]   Final Result      No acute fracture of the mandible.           Assessment/Plan  Smoker- (present on admission)   Assessment & Plan    Patient advised and counseled regarding smoking cessation, patient is stated that he is willing to quit at this time, he was advised about different alternatives that can help him quitting.  Spent more than 3 minutes counseling patient.     Sepsis (HCC)- (present on  admission)   Assessment & Plan    This is sepsis (without associated acute organ dysfunction). due to Right submandibular sialoadenitis without abscess, continue IV antibiotics, follow-up blood cultures, IV fluids.    3/21 resolving     Sialoadenitis of submandibular gland- (present on admission)   Assessment & Plan    3/20 Right-sided, CT showed Right submandibular sialoadenitis without abscess, at this time airway is patent but will need close monitoring, patient is able to speak and swallow, denies any shortness of breath, will continue on IV steroids, IV antibiotics, follow-up cultures, pending on evolution will consider ENT consult.    3/21 continue pain control  Leukocytosis improving  We will add sours  ENT consult as needed          VTE prophylaxis: SCD

## 2019-03-21 NOTE — ASSESSMENT & PLAN NOTE
3/20 Right-sided, CT showed Right submandibular sialoadenitis without abscess, at this time airway is patent but will need close monitoring, patient is able to speak and swallow, denies any shortness of breath, will continue on IV steroids, IV antibiotics, follow-up cultures, pending on evolution will consider ENT consult.    3/21 continue pain control  Leukocytosis improving  We will add sours  ENT consult as needed

## 2019-03-22 VITALS
HEIGHT: 67 IN | SYSTOLIC BLOOD PRESSURE: 122 MMHG | RESPIRATION RATE: 18 BRPM | HEART RATE: 75 BPM | DIASTOLIC BLOOD PRESSURE: 79 MMHG | OXYGEN SATURATION: 95 % | BODY MASS INDEX: 30.1 KG/M2 | TEMPERATURE: 97.1 F | WEIGHT: 191.8 LBS

## 2019-03-22 LAB
ANION GAP SERPL CALC-SCNC: 5 MMOL/L (ref 0–11.9)
BASOPHILS # BLD AUTO: 0.1 % (ref 0–1.8)
BASOPHILS # BLD: 0.02 K/UL (ref 0–0.12)
BUN SERPL-MCNC: 14 MG/DL (ref 8–22)
CALCIUM SERPL-MCNC: 8.7 MG/DL (ref 8.5–10.5)
CHLORIDE SERPL-SCNC: 108 MMOL/L (ref 96–112)
CO2 SERPL-SCNC: 24 MMOL/L (ref 20–33)
CREAT SERPL-MCNC: 0.73 MG/DL (ref 0.5–1.4)
EOSINOPHIL # BLD AUTO: 0 K/UL (ref 0–0.51)
EOSINOPHIL NFR BLD: 0 % (ref 0–6.9)
ERYTHROCYTE [DISTWIDTH] IN BLOOD BY AUTOMATED COUNT: 43 FL (ref 35.9–50)
GLUCOSE SERPL-MCNC: 150 MG/DL (ref 65–99)
HCT VFR BLD AUTO: 41.3 % (ref 42–52)
HGB BLD-MCNC: 13.4 G/DL (ref 14–18)
IMM GRANULOCYTES # BLD AUTO: 0.26 K/UL (ref 0–0.11)
IMM GRANULOCYTES NFR BLD AUTO: 1.4 % (ref 0–0.9)
LYMPHOCYTES # BLD AUTO: 1.51 K/UL (ref 1–4.8)
LYMPHOCYTES NFR BLD: 8.3 % (ref 22–41)
MCH RBC QN AUTO: 30.4 PG (ref 27–33)
MCHC RBC AUTO-ENTMCNC: 32.4 G/DL (ref 33.7–35.3)
MCV RBC AUTO: 93.7 FL (ref 81.4–97.8)
MONOCYTES # BLD AUTO: 0.84 K/UL (ref 0–0.85)
MONOCYTES NFR BLD AUTO: 4.6 % (ref 0–13.4)
NEUTROPHILS # BLD AUTO: 15.51 K/UL (ref 1.82–7.42)
NEUTROPHILS NFR BLD: 85.6 % (ref 44–72)
NRBC # BLD AUTO: 0 K/UL
NRBC BLD-RTO: 0 /100 WBC
PLATELET # BLD AUTO: 278 K/UL (ref 164–446)
PMV BLD AUTO: 9.5 FL (ref 9–12.9)
POTASSIUM SERPL-SCNC: 4.6 MMOL/L (ref 3.6–5.5)
RBC # BLD AUTO: 4.41 M/UL (ref 4.7–6.1)
SODIUM SERPL-SCNC: 137 MMOL/L (ref 135–145)
WBC # BLD AUTO: 18.1 K/UL (ref 4.8–10.8)

## 2019-03-22 PROCEDURE — 36415 COLL VENOUS BLD VENIPUNCTURE: CPT

## 2019-03-22 PROCEDURE — 700111 HCHG RX REV CODE 636 W/ 250 OVERRIDE (IP): Performed by: INTERNAL MEDICINE

## 2019-03-22 PROCEDURE — 80048 BASIC METABOLIC PNL TOTAL CA: CPT

## 2019-03-22 PROCEDURE — 700105 HCHG RX REV CODE 258: Performed by: INTERNAL MEDICINE

## 2019-03-22 PROCEDURE — 700111 HCHG RX REV CODE 636 W/ 250 OVERRIDE (IP): Performed by: HOSPITALIST

## 2019-03-22 PROCEDURE — 99239 HOSP IP/OBS DSCHRG MGMT >30: CPT | Performed by: INTERNAL MEDICINE

## 2019-03-22 PROCEDURE — 85025 COMPLETE CBC W/AUTO DIFF WBC: CPT

## 2019-03-22 RX ORDER — PREDNISONE 10 MG/1
10 TABLET ORAL DAILY
Qty: 15 TAB | Refills: 0 | Status: SHIPPED | OUTPATIENT
Start: 2019-03-22

## 2019-03-22 RX ORDER — AMOXICILLIN AND CLAVULANATE POTASSIUM 875; 125 MG/1; MG/1
1 TABLET, FILM COATED ORAL 2 TIMES DAILY
Qty: 20 TAB | Refills: 0 | Status: SHIPPED | OUTPATIENT
Start: 2019-03-22 | End: 2019-04-01

## 2019-03-22 RX ORDER — IBUPROFEN 400 MG/1
600 TABLET ORAL EVERY 6 HOURS PRN
Status: DISCONTINUED | OUTPATIENT
Start: 2019-03-22 | End: 2019-03-22 | Stop reason: HOSPADM

## 2019-03-22 RX ORDER — IBUPROFEN 600 MG/1
600 TABLET ORAL EVERY 6 HOURS PRN
Qty: 30 TAB | Refills: 0 | Status: SHIPPED | OUTPATIENT
Start: 2019-03-22

## 2019-03-22 RX ADMIN — DEXAMETHASONE SODIUM PHOSPHATE 6 MG: 4 INJECTION, SOLUTION INTRAMUSCULAR; INTRAVENOUS at 05:36

## 2019-03-22 RX ADMIN — DEXAMETHASONE SODIUM PHOSPHATE 6 MG: 4 INJECTION, SOLUTION INTRAMUSCULAR; INTRAVENOUS at 00:36

## 2019-03-22 RX ADMIN — AMPICILLIN SODIUM AND SULBACTAM SODIUM 3 G: 2; 1 INJECTION, POWDER, FOR SOLUTION INTRAMUSCULAR; INTRAVENOUS at 05:36

## 2019-03-22 RX ADMIN — AMPICILLIN SODIUM AND SULBACTAM SODIUM 3 G: 2; 1 INJECTION, POWDER, FOR SOLUTION INTRAMUSCULAR; INTRAVENOUS at 00:36

## 2019-03-22 NOTE — PROGRESS NOTES
Pt discharged home. Discharge instructions given and explained to pt. Follow up appointments scheduled. Pt verbalized an understanding of the discharge education.

## 2019-03-22 NOTE — CARE PLAN
Problem: Infection  Goal: Will remain free from infection    Intervention: Assess signs and symptoms of infection  No new s/sx of infection. VSS, afebrile. Facial swelling decreasing per pt. Receiving IV abx and steriods. CTM.      Problem: Pain Management  Goal: Pain level will decrease to patient's comfort goal    Intervention: Follow pain managment plan developed in collaboration with patient and Interdisciplinary Team  Pt complains of mouth pain, medicated per MAR. Pt has prn pain meds, tramadol and toradol available for pain control.

## 2019-03-22 NOTE — DISCHARGE SUMMARY
Discharge Summary    CHIEF COMPLAINT ON ADMISSION  Chief Complaint   Patient presents with   • Dental Pain     Pt reports dental pain/swelling on Mon, pt now with increased swelling/ pain to right side jaw/neck. pt having a difficult time swallowing/controlling secretions.    • Oral Swelling   • Difficulty Swallowing   • Neck Swelling       Reason for Admission  SENT BY MD DENTAL PAIN     Admission Date  3/20/2019    CODE STATUS  Full Code    HPI & HOSPITAL COURSE      33 y.o. male admitted 3/20/2019 with history of tobacco use admitted for right sided sialoadenitis, currently on antibiotics and steroids.  Patient's right sided edema and pain has significantly improved.  He denies any respiratory symptoms.  He is maintaining his airway.  He is tolerating an oral diet.  Patient reports minimal right-sided residual facial edema.  Leukocytosis has minimally increased due to steroid use.  He will be discharged home on an oral course of steroids and antibiotics.  He has an appointment to follow-up with dentistry and 3 days.    Therefore, he is discharged in good and stable condition to home with close outpatient follow-up.    The patient met 2-midnight criteria for an inpatient stay at the time of discharge.    Discharge Date  3/22/2019    FOLLOW UP ITEMS POST DISCHARGE  Primary care provider/discharge clinic in 1 week  Prednisone taper as directed  Augmentin times 10 days.    DISCHARGE DIAGNOSES  Active Problems:    Sialoadenitis of submandibular gland POA: Yes    Sepsis (HCC) POA: Yes    Smoker POA: Yes  Resolved Problems:    * No resolved hospital problems. *      FOLLOW UP  Future Appointments  Date Time Provider Department Center   3/25/2019 1:20 PM [facility] [address]   3/27/2019 7:40 AM Irene Wright M.D. [address]   5/13/2019 2:00 PM Ric Louie M.D. [facility] None     No follow-up provider specified.    MEDICATIONS ON DISCHARGE     Medication List      START taking these  medications      Instructions   amoxicillin-clavulanate 875-125 MG Tabs  Commonly known as:  AUGMENTIN   Take 1 Tab by mouth 2 times a day for 10 days.  Dose:  1 Tab     ibuprofen 600 MG Tabs  Commonly known as:  MOTRIN   Take 1 Tab by mouth every 6 hours as needed for Mild Pain.  Dose:  600 mg     predniSONE 10 MG Tabs  Commonly known as:  DELTASONE   Take 1 Tab by mouth every day. Take 20 mg bid x 2 days, then 20 mg daily x 2 days, then 10 mg daily x 2 days, then 5 mg daily x 2 days then stop.  Dose:  10 mg     sore throat spray 1.4 % Liqd  Commonly known as:  CHLORASEPTIC   Spray 1 Spray in mouth/throat every 2 hours as needed.  Dose:  1 Spray        STOP taking these medications    cephALEXin 500 MG Caps  Commonly known as:  KEFLEX            Allergies  Allergies   Allergen Reactions   • Shrimp (Diagnostic)        DIET  Orders Placed This Encounter   Procedures   • Diet Order Full Liquid     Standing Status:   Standing     Number of Occurrences:   1     Order Specific Question:   Diet:     Answer:   Full Liquid [11]       ACTIVITY  As tolerated.  Weight bearing as tolerated    CONSULTATIONS  None    PROCEDURES  None    LABORATORY  Lab Results   Component Value Date    SODIUM 137 03/22/2019    POTASSIUM 4.6 03/22/2019    CHLORIDE 108 03/22/2019    CO2 24 03/22/2019    GLUCOSE 150 (H) 03/22/2019    BUN 14 03/22/2019    CREATININE 0.73 03/22/2019        Lab Results   Component Value Date    WBC 18.1 (H) 03/22/2019    HEMOGLOBIN 13.4 (L) 03/22/2019    HEMATOCRIT 41.3 (L) 03/22/2019    PLATELETCT 278 03/22/2019        Total time of the discharge process exceeds 40 minutes.

## 2019-03-22 NOTE — DISCHARGE INSTRUCTIONS
Discharge Instructions    Discharged to home by car with relative. Discharged via wheelchair, hospital escort: Yes.  Special equipment needed: Not Applicable    Be sure to schedule a follow-up appointment with your primary care doctor or any specialists as instructed.     Discharge Plan:   Smoking Cessation Offered: Patient Refused  Influenza Vaccine Indication: Patient Refuses    I understand that a diet low in cholesterol, fat, and sodium is recommended for good health. Unless I have been given specific instructions below for another diet, I accept this instruction as my diet prescription.   Other diet: Regular    Special Instructions: None    · Is patient discharged on Warfarin / Coumadin?   No     Salivary Gland Infection  Introduction  A salivary gland infection is an infection in one or more of the glands that produce spit (saliva). You have six major salivary glands. Each gland has a duct that carries saliva into your mouth. Saliva keeps your mouth moist and breaks down the food that you eat. It also helps to prevent tooth decay.  Two salivary glands are located just in front of your ears (parotid). The ducts for these glands open up inside your cheeks, near your back teeth. You also have two glands under your tongue (sublingual) and two glands under your jaw (submandibular). The ducts for these glands open under your tongue. Any salivary gland can become infected. Most infections occur in the parotid glands or submandibular glands.  What are the causes?  Salivary glands can be infected by viruses or bacteria.  · The mumps virus is the most common cause of viral salivary gland infections, though mumps is now rare in many areas because of vaccination.  ¨ This infection causes swelling in both parotid glands.  ¨ Viral infections are more common in children.  · The bacteria that cause salivary gland infections are usually the same bacteria that normally live in your mouth.  ¨ A stone can form in a salivary gland  and block the flow of saliva. As a result, saliva backs up into the salivary gland. Bacteria may then start to grow behind the blockage and cause infection.  ¨ Bacterial infections usually cause pain and swelling on one side of the face. Submandibular gland swelling occurs under the jaw. Parotid swelling occurs in front of the ear.  ¨ Bacterial infections are more common in adults.  What increases the risk?  Children who do not get the MMR (measles, mumps, rubella) vaccine are more likely to get mumps, which can cause a viral salivary gland infection.  Risk factors for bacterial infections include:  · Poor dental care (oral hygiene).  · Smoking.  · Not drinking enough water.  · Having a disease that causes dry mouth and dry eyes (Mikulicz syndrome or Sjogren syndrome).  What are the signs or symptoms?  The main sign of salivary gland infection is a swollen salivary gland. This type of inflammation is often called sialadenitis. You may have swelling in front of your ear, under your jaw, or under your tongue. Swelling may get worse when you eat and decrease after you eat. Other signs and symptoms include:  · Pain.  · Tenderness.  · Redness.  · Dry mouth.  · Bad taste in your mouth.  · Difficulty chewing and swallowing.  · Fever.  How is this diagnosed?  Your health care provider may suspect a salivary gland infection based on your signs and symptoms. He or she will also do a physical exam. The health care provider will look and feel inside your mouth to see whether a stone is blocking a salivary gland duct. You may need to see an ear, nose, and throat specialist (ENT or otolaryngologist) for diagnosis and treatment. You may also need to have diagnostic tests, such as:  · An X-ray to check for a stone.  · Other imaging studies to look for an abscess and to rule out other causes of swelling. These tests may include:  ¨ Ultrasound.  ¨ CT scan.  ¨ MRI.  · Culture and sensitivity test. This involves collecting a sample of  pus for testing in the lab to see what bacteria grow and what antibiotics they are sensitive to. The testing sample may be:  ¨ Swabbed from a salivary gland duct.  ¨ Withdrawn from a swollen gland with a needle (aspiration).  How is this treated?  Viral salivary gland infections usually clear up without treatment. Bacterial infections are usually treated with antibiotic medicine. Severe infections that cause difficulty with swallowing may be treated with an IV antibiotic in the hospital.  Other treatments may include:  · Probing and widening the salivary duct to allow a stone to pass. In some cases, a thin, flexible scope (endoscope) may be inserted into the duct to find a stone and remove it.  · Breaking up a stone using sound waves.  · Draining an infected gland (abscess) with a needle.  · In some cases, you may need surgery so your health care provider can:  ¨ Remove a stone.  ¨ Drain pus from an abscess.  ¨ Remove a badly infected gland.  Follow these instructions at home:  · Take medicines only as directed by your health care provider.  · If you were prescribed an antibiotic medicine, finish it all even if you start to feel better.  · Follow these instructions every few hours:  ¨ Suck on a lemon candy to stimulate the flow of saliva.  ¨ Put a warm compress over the gland.  ¨ Gently massage the gland.  · Drink enough fluid to keep your urine clear or pale yellow.  · Rinse your mouth with a mixture of warm water and salt every few hours. To make this mixture, add a pinch of salt to 1 cup of warm water.  · Practice good oral hygiene by brushing and flossing your teeth after meals and before you go to bed.  · Do not use any tobacco products, including cigarettes, chewing tobacco, or electronic cigarettes. If you need help quitting, ask your health care provider.  Contact a health care provider if:  · You have pain and swelling in your face, jaw, or mouth after eating.  · You have persistent swelling in any of these  places:  ¨ In front of your ear.  ¨ Under your jaw.  ¨ Inside your mouth.  Get help right away if:  · You have pain and swelling in your face, jaw, or mouth that are getting worse.  · Your pain and swelling make it hard to swallow or breathe.  This information is not intended to replace advice given to you by your health care provider. Make sure you discuss any questions you have with your health care provider.  Document Released: 01/25/2006 Document Revised: 05/25/2017 Document Reviewed: 05/20/2015  © 2017 Elsevier    Depression / Suicide Risk    As you are discharged from this [facility] facility, it is important to learn how to keep safe from harming yourself.    Recognize the warning signs:  · Abrupt changes in personality, positive or negative- including increase in energy   · Giving away possessions  · Change in eating patterns- significant weight changes-  positive or negative  · Change in sleeping patterns- unable to sleep or sleeping all the time   · Unwillingness or inability to communicate  · Depression  · Unusual sadness, discouragement and loneliness  · Talk of wanting to die  · Neglect of personal appearance   · Rebelliousness- reckless behavior  · Withdrawal from people/activities they love  · Confusion- inability to concentrate     If you or a loved one observes any of these behaviors or has concerns about self-harm, here's what you can do:  · Talk about it- your feelings and reasons for harming yourself  · Remove any means that you might use to hurt yourself (examples: pills, rope, extension cords, firearm)  · Get professional help from the community (Mental Health, Substance Abuse, psychological counseling)  · Do not be alone:Call your Safe Contact- someone whom you trust who will be there for you.  · Call your local CRISIS HOTLINE 520-1767 or 730-978-5691  · Call your local Children's Mobile Crisis Response Team [place] (660) 187-8939 or www.[employer]  · Call the toll free National  Suicide Prevention Hotlines   · National Suicide Prevention Lifeline 317-059-XGXF (1532)  · National Hope Line Network 800-SUICIDE (283-5171)

## 2019-03-22 NOTE — PROGRESS NOTES
"Assumed care of pt. A&Ox4. Assessment completed, POC discussed. Pt has R side facial/neck swelling r/t Sialoadenitis. Receiving IV abx. Pt denies pain or discomfort except for when pressure is applied to R jaw. States \"I'm ready to go home. This has gotten a whole lot better. When I came in I was so swollen on the right side I couldn't even talk and now I am feeling fine.\" Will follow up with MD. Pt ambulates with steady gait. Able to make needs known. Safety precautions/hourly rounding in place.   "

## 2019-03-22 NOTE — PROGRESS NOTES
Assumed care of pt, discussed plan of care. A&Ox4. RA, tolerates well. Complains of 6/10 mouth pain, medicated per MAR. Last BM, 03/19 (per pt). Continent of B/B. Skin intact; some right side facial swelling. Up-self with steady gait. On full liquid diet, tolerating well. IV, CDI, running NS at 83 mL/hr. Fall precautions in place; bed lowest position, treaded socks on, call light within reach. All needs met at this time.

## 2019-05-01 ENCOUNTER — NON-PROVIDER VISIT (OUTPATIENT)
Dept: OCCUPATIONAL MEDICINE | Facility: CLINIC | Age: 34
End: 2019-05-01

## 2019-05-01 ENCOUNTER — OFFICE VISIT (OUTPATIENT)
Dept: OCCUPATIONAL MEDICINE | Facility: CLINIC | Age: 34
End: 2019-05-01

## 2019-05-01 VITALS
OXYGEN SATURATION: 94 % | HEART RATE: 94 BPM | WEIGHT: 194 LBS | BODY MASS INDEX: 29.4 KG/M2 | HEIGHT: 68 IN | TEMPERATURE: 98.1 F | DIASTOLIC BLOOD PRESSURE: 90 MMHG | SYSTOLIC BLOOD PRESSURE: 106 MMHG

## 2019-05-01 DIAGNOSIS — Z02.1 PRE-EMPLOYMENT DRUG SCREENING: ICD-10-CM

## 2019-05-01 DIAGNOSIS — Z02.1 PRE-EMPLOYMENT HEALTH SCREENING EXAMINATION: ICD-10-CM

## 2019-05-01 LAB
AMP AMPHETAMINE: NORMAL
COC COCAINE: NORMAL
INT CON NEG: NORMAL
INT CON POS: NORMAL
MET METHAMPHETAMINES: NORMAL
OPI OPIATES: NORMAL
PCP PHENCYCLIDINE: NORMAL
POC DRUG COMMENT 753798-OCCUPATIONAL HEALTH: NORMAL
THC: NORMAL

## 2019-05-01 PROCEDURE — 80305 DRUG TEST PRSMV DIR OPT OBS: CPT | Performed by: PREVENTIVE MEDICINE

## 2019-05-01 PROCEDURE — 92553 AUDIOMETRY AIR & BONE: CPT | Performed by: PREVENTIVE MEDICINE

## 2019-05-01 PROCEDURE — 8915 PR COMPREHENSIVE PHYSICAL: Performed by: PREVENTIVE MEDICINE

## 2019-05-13 ENCOUNTER — APPOINTMENT (OUTPATIENT)
Dept: INTERNAL MEDICINE | Facility: MEDICAL CENTER | Age: 34
End: 2019-05-13
Payer: COMMERCIAL

## 2020-05-27 ENCOUNTER — OCCUPATIONAL MEDICINE (OUTPATIENT)
Dept: URGENT CARE | Facility: PHYSICIAN GROUP | Age: 35
End: 2020-05-27
Payer: OTHER MISCELLANEOUS

## 2020-05-27 VITALS
SYSTOLIC BLOOD PRESSURE: 104 MMHG | RESPIRATION RATE: 12 BRPM | DIASTOLIC BLOOD PRESSURE: 60 MMHG | HEIGHT: 66 IN | HEART RATE: 74 BPM | OXYGEN SATURATION: 99 % | BODY MASS INDEX: 30.37 KG/M2 | TEMPERATURE: 97.9 F | WEIGHT: 189 LBS
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
## 2020-05-27 DIAGNOSIS — Z02.1 PRE-EMPLOYMENT DRUG SCREENING: ICD-10-CM

## 2020-05-27 DIAGNOSIS — S61.411A LACERATION OF RIGHT HAND WITHOUT FOREIGN BODY, INITIAL ENCOUNTER: ICD-10-CM

## 2020-05-27 LAB
AMP AMPHETAMINE: NORMAL
COC COCAINE: NORMAL
INT CON NEG: NEGATIVE
INT CON POS: POSITIVE
MET METHAMPHETAMINES: NORMAL
OPI OPIATES: NORMAL
PCP PHENCYCLIDINE: NORMAL
POC DRUG COMMENT 753798-OCCUPATIONAL HEALTH: NEGATIVE
THC: NORMAL

## 2020-05-27 PROCEDURE — 80305 DRUG TEST PRSMV DIR OPT OBS: CPT | Mod: 29 | Performed by: PHYSICIAN ASSISTANT

## 2020-05-27 PROCEDURE — 90715 TDAP VACCINE 7 YRS/> IM: CPT | Mod: 29 | Performed by: PHYSICIAN ASSISTANT

## 2020-05-27 PROCEDURE — 12042 INTMD RPR N-HF/GENIT2.6-7.5: CPT | Mod: 29 | Performed by: PHYSICIAN ASSISTANT

## 2020-05-27 PROCEDURE — 90471 IMMUNIZATION ADMIN: CPT | Mod: 29 | Performed by: PHYSICIAN ASSISTANT

## 2020-05-27 RX ORDER — AMOXICILLIN AND CLAVULANATE POTASSIUM 875; 125 MG/1; MG/1
1 TABLET, FILM COATED ORAL 2 TIMES DAILY
Qty: 14 TAB | Refills: 0 | Status: SHIPPED | OUTPATIENT
Start: 2020-05-27 | End: 2020-06-03

## 2020-05-27 ASSESSMENT — FIBROSIS 4 INDEX: FIB4 SCORE: 0.64

## 2020-05-27 NOTE — LETTER
[facility]  [address] [place] [address]. [address] - [place] [address]  Phone:  [phone] - Fax:  [phone]   [facility] Progress Report and Disability Certification  Date of Service: 5/27/2020   No Show:  No  Date / Time of Next Visit: 6/6/2020   Claim Information   Patient Name: Jorden Moses  Claim Number:     Employer: [employer]  Date of Injury: 5/27/2020     Insurer / TPA:   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    Medical Information   Related to Industrial Injury? Yes    Subjective Complaints:  Date of injury 5/27/2020: Patient presents complaining of new workplace injury to right hand sustained while he was cleaning the blade of a piece of machinery at work.  Last tetanus was 5 years ago.  No previous injury.  No second job.  Patient states large skin flap.  Patient reports full range of motion of his hand, sensation is normal for him.  No other complaints.     Objective Findings: Healthy well-appearing male in no acute distress.  He has a around 4 cm linear laceration/avulsion on the dorsum of the right hand on the ulnar aspect.  Flexion and extension of the digits are intact.  Nerve distribution intact.  No tendinous injury identified.  No foreign body identified.  No bony tenderness to palpation.  Radial, median, and ulnar nerve distributions intact.  Brisk cap refill in all fingers.   Pre-Existing Condition(s): None   Assessment:   Initial Visit    Status: Additional Care Required  Permanent Disability:No    Plan: Medication    Diagnostics:      Comments:  The wound was thoroughly cleaned, repaired using 6 sutures, tetanus was updated, and the patient was given instructions to apply antibiotic ointment, nonadherent dressing, and then wrap with Coban.  He is to monitor  closely for infection and take ora  l antibiotics.    Disability Information   Status: Released to Restricted Duty    From:  5/27/2020  Through: 6/6/2020 Restrictions are: Temporary   Physical Restrictions   Sitting:    Standing:    Stooping:    Bending:      Squatting:    Walking:    Climbing:    Pushing:      Pulling:    Other:    Reaching Above Shoulder (L):   Reaching Above Shoulder (R):       Reaching Below Shoulder (L):    Reaching Below Shoulder (R):      Not to exceed Weight Limits   Carrying(hrs):   Weight Limit(lb):   Lifting(hrs):   Weight  Limit(lb):     Comments: Limit use of right hand.  Wound is to remained covered with antibiotic ointment, nonadherent dressing, and protective covering such as Covan.  Wear gloves.  Take antibiotics.  Return if you see signs of infection.    Repetitive Actions   Hands: i.e. Fine Manipulations from Grasping:     Feet: i.e. Operating Foot Controls:     Driving / Operate Machinery:     Physician Name: Gael Black P.A.-C. Physician Signature: GAEL Ma P.A.-C. e-Signature: Dr. Leonardo Miller, Medical Director   Clinic Name / Location: [address]  [place] [address] Clinic Phone Number: Dept: [phone]   Appointment Time: 9:40 Am Visit Start Time: 10:15 AM   Check-In Time:  10:06 Am Visit Discharge Time:  11:26 AM   Original-Treating Physician or Chiropractor    Page 2-Insurer/TPA    Page 3-Employer    Page 4-Employee

## 2020-05-27 NOTE — PROCEDURES
Laceration Repair    Date/Time: 5/27/2020 11:17 AM  Performed by: Gael Black P.A.-C.  Authorized by: Gael Black P.A.-C.   Body area: upper extremity  Location details: right hand  Laceration length: 5 cm  Foreign bodies: no foreign bodies  Tendon involvement: none  Nerve involvement: none  Vascular damage: no  Anesthesia: local infiltration    Anesthesia:  Local Anesthetic: lidocaine 1% without epinephrine  Anesthetic total: 4 mL    Sedation:  Patient sedated: no    Preparation: Patient was prepped and draped in the usual sterile fashion.  Irrigation solution: chlorhexadine.  Irrigation method: syringe  Amount of cleaning: extensive  Debridement: minimal  Degree of undermining: none  Skin closure: 4-0 nylon  Number of sutures: 6  Technique: simple  Approximation: close  Approximation difficulty: simple  Dressing: antibiotic ointment and 4x4 sterile gauze  Patient tolerance: Patient tolerated the procedure well with no immediate complications  Comments: I discussed the appropriate time to have stitches removed.  I also discussed the 4 cardinal signs of infection with the patient which are increasing pain, redness, warmth, and swelling.  I also discussed the likelihood that this will scar.  I did not see or feel any foreign bodies however there is always a risk of infection and I took all reasonable measures to lessen that risk.

## 2020-05-27 NOTE — PATIENT INSTRUCTIONS
Recheck wound in 48 hours by medical professional  Sutures removed in 7-10 days   Recheck at urgent care to get clearance for work   Take antibiotics twice daily for 7 days  Keep wound clean, covered as demonstrated, dressing changes daily  Monitor for signs of infection  Work restrictions as listed on workers comp forms

## 2020-05-27 NOTE — PROGRESS NOTES
"Subjective:   Jorden Moses is a 34 y.o. male who presents for Work-Related Injury (Laceration top of right hand.)      HPI    ROS    Medications:    • ibuprofen Tabs  • predniSONE Tabs  • sore throat spray Liqd    Allergies: Shrimp (diagnostic)    Problem List: Jorden Moses has Open wound of finger; Sialoadenitis of submandibular gland; Sepsis (HCC); and Smoker on their problem list.    Surgical History:  No past surgical history on file.    Past Social Hx: Jorden Moses  reports that he has quit smoking. He has never used smokeless tobacco. He reports current alcohol use. He reports that he does not use drugs.     Past Family Hx:  Jorden Moses family history is not on file.     Problem list, medications, and allergies reviewed by myself today in Epic.     Objective:     /60   Pulse 74   Temp 36.6 °C (97.9 °F) (Temporal)   Resp 12   Ht 1.676 m (5' 6\")   Wt 85.7 kg (189 lb)   SpO2 99%   BMI 30.51 kg/m²     Physical Exam        Assessment/Plan:     Diagnosis and associated orders:     No diagnosis found.   Comments/MDM:       •                Differential diagnosis, natural history, supportive care, and indications for immediate follow-up discussed.    Advised the patient to follow-up with the primary care physician for recheck, reevaluation, and consideration of further management.    Please note that this dictation was created using voice recognition software. I have made reasonable attempt to correct obvious errors, but I expect that there are errors of grammar and possibly content that I did not discover before finalizing the note.    This note was electronically signed by Gael Black PA-C  "

## 2020-05-27 NOTE — LETTER
"EMPLOYEE’S CLAIM FOR COMPENSATION/ REPORT OF INITIAL TREATMENT  FORM C-4    EMPLOYEE’S CLAIM - PROVIDE ALL INFORMATION REQUESTED   First Name  Jorden Last Name  Aurelia Birthdate                    1985                Sex  male Claim Number   Home Address  [address] Age  34 y.o. 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  Zip  [address]    Date of Injury  5/27/2020               Hour of Injury  9:00 AM Date Employer Notified  5/27/2020 Last Day of Work after Injury or Occupational Disease  5/27/2020 Supervisor to Whom Injury Reported  Jose David   Address or Location of Accident (if applicable)  [[address]   What were you doing at the time of accident? (if applicable)  Cleaning a BLADE.    How did this injury or occupational disease occur? (Be specific an answer in detail. Use additional sheet if necessary)  CLEANING a BLADE and cut my right hand   If you believe that you have an occupational disease, when did you first have knowledge of the disability and it relationship to your employment?  No Witnesses to the Accident  My Assistant Elvin.      Nature of Injury or Occupational Disease  Laceration  Part(s) of Body Injured or Affected  Hand (R), N/A, N/A    I certify that the above is true and correct to the best of my knowledge and that I have provided this information in order to obtain the benefits of [facility] [place] [facility] Industrial Insurance " and Occupational Diseases Acts [place] inclusive [place] Chapter 617 of [place]).  I hereby authorize any physician, chiropractor, surgeon, practitioner, or other person, any hospital, including [facility] or [facility] hospital, any medical service organization, any insurance company, or other institution or organization to release to each other, any medical or other information, including benefits paid or payable, pertinent to this injury or disease, except information relative to diagnosis, treatment and/or counseling for AIDS, psychological conditions, alcohol or controlled substances, for which I must give specific authorization.  A Photostat of this authorization shall be as valid as the original.     Date   Place  [facility]   Employee’s Signature   THIS REPORT MUST BE COMPLETED AND MAILED WITHIN 3 WORKING DAYS OF TREATMENT   Place  [facility]  Name of Facility  [place]   Date  5/27/2020 Diagnosis  (S61.411A) Laceration of right hand without foreign body, initial encounter  (Y99.0) Accident at workplace Is there evidence the injured employee was under the influence of alcohol and/or another controlled substance at the time of accident?   Hour  10:15 AM Description of Injury or Disease  Diagnoses of Laceration of right hand without foreign body, initial encounter and Accident at workplace were pertinent to this visit. No   Treatment  The wound was extensively cleaned and irrigated.  No foreign bodies were seen or felt.  Local anesthetic was applied and the wound was repaired with 6 stitches.  Tetanus updated, patient given prescription for antibiotic to take for the next 7 days and specific instructions to keep wound dressed and covered, monitor for infection, limit use of hand.  Have you advised the patient to remain off work five days or more? No   X-Ray Findings      If Yes   From Date  To Date      From information given by the employee, together with  "medical evidence, can you directly connect this injury or occupational disease as job incurred?  Yes If No Full Duty No Modified Duty  Yes   Is additional medical care by a physician indicated?  Yes If Modified Duty, Specify any Limitations / Restrictions  The wound must remained dressed and covered, limit overuse of right hand   Do you know of any previous injury or disease contributing to this condition or occupational disease?                            No   Date  5/27/2020 Print Doctor’s Name Gael Black P.A.-C. I certify the employer’s copy of  this form was mailed on:   Address  [address] [place] [address]. [address] Insurer’s Use Only     [facility] Zip  [address]    Provider’s Tax ID Number  [id number] Telephone  Dept: [phone]        e-GAEL Jauregui P.A.-C.   e-Signature: Dr. Leonardo Miller,   Medical Director Degree  MD        ORIGINAL-TREATING PHYSICIAN OR CHIROPRACTOR    PAGE 2-INSURER/TPA    PAGE 3-EMPLOYER    PAGE 4-EMPLOYEE             Form C-4 (rev.10/07)              BRIEF DESCRIPTION OF RIGHTS AND BENEFITS  (Pursuant to [place]RS 616C.050)    Notice of Injury or Occupational Disease (Incident Report Form C-1): If an injury or occupational disease (OD) arises out of and in the course of employment, you must provide written notice to your employer as soon as practicable, but no later than 7 days after the accident or OD. Your employer shall maintain a sufficient supply of the required forms.    Claim for Compensation (Form C-4): If medical treatment is sought, the form C-4 is available at the place of initial treatment. A completed \"Claim for Compensation\" (Form C-4) must be filed within 90 days after an accident or OD. The treating physician or chiropractor must, within 3 working days after treatment, complete and mail to the employer, the employer's insurer and third-party , the Claim for Compensation.    Medical Treatment: If you require medical treatment for your " on-the-job injury or OD, you may be required to select a physician or chiropractor from a list provided by your workers’ compensation insurer, if it has contracted with an Organization for Managed Care (MCO) or Preferred Provider Organization (PPO) or providers of health care. If your employer has not entered into a contract with an MCO or PPO, you may select a physician or chiropractor from the Panel of Physicians and Chiropractors. Any medical costs related to your industrial injury or OD will be paid by your insurer.    Temporary Total Disability (TTD): If your doctor has certified that you are unable to work for a period of at least 5 consecutive days, or 5 cumulative days in a 20-day period, or places restrictions on you that your employer does not accommodate, you may be entitled to TTD compensation.    Temporary Partial Disability (TPD): If the wage you receive upon reemployment is less than the compensation for TTD to which you are entitled, the insurer may be required to pay you TPD compensation to make up the difference. TPD can only be paid for a maximum of 24 months.    Permanent Partial Disability (PPD): When your medical condition is stable and there is an indication of a PPD as a result of your injury or OD, within 30 days, your insurer must arrange for an evaluation by a rating physician or chiropractor to determine the degree of your PPD. The amount of your PPD award depends on the date of injury, the results of the PPD evaluation and your age and wage.    Permanent Total Disability (PTD): If you are medically certified by a treating physician or chiropractor as permanently and totally disabled and have been granted a PTD status by your insurer, you are entitled to receive monthly benefits not to exceed 66 2/3% of your average monthly wage. The amount of your PTD payments is subject to reduction if you previously received a PPD award.    Vocational Rehabilitation Services: You may be eligible for  vocational rehabilitation services if you are unable to return to the job due to a permanent physical impairment or permanent restrictions as a result of your injury or occupational disease.    Transportation and Per Anaya Reimbursement: You may be eligible for travel expenses and per anaya associated with medical treatment.    Reopening: You may be able to reopen your claim if your condition worsens after claim closure.    Appeal Process: If you disagree with a written determination issued by the insurer or the insurer does not respond to your request, you may appeal to the Department of Administration, , by following the instructions contained in your determination letter. You must appeal the determination within 70 days from the date of the determination letter at [address], [address], [place], [place] [address], or [address], [address], [place], [place] [address]. If you disagree with the  decision, you may appeal to the Department of Administration, . You must file your appeal within 30 days from the date of the  decision letter at [address], [address], [place], [place] [address], or [address], [address], [place], [place] [address]. If you disagree with a decision of an , you may file a petition for judicial review with the District Court. You must do so within 30 days of the Appeal Officer’s decision. You may be represented by an  at your own expense or you may contact the [facility] for possible representation.    [place]  for Injured Workers (NAIW): If you disagree with a  decision, you may request that NAIW represent you without charge at an  Hearing. For information regarding denial of benefits, you may contact the [facility] at: [address], [address], [place] [address], [phone]) [phone], or [address], [address], [place] [address],  (828) [phone]    To File a Complaint with the Division: If you wish to file a complaint with the  of the Division of Industrial Relations (DIR),  please contact the Workers’ Compensation Section, [address], [address], [place], [place] [address], telephone (498) 863-7752, or [address], [address], [place], [place] [address], telephone (112) [phone].    For assistance with Workers’ Compensation Issues: You may contact the Office of the Governor Consumer Health Assistance, [address] [place] [address], [address], [place], [place] [address], Toll Free 1-623.784.6003, Web site: http://[employer].TakWak.[place].us, E-mail sonya@[facility].nv.                   __________________________________________________________________                                                     _________        Employee Name / Signature                                                                                                                                              Date                                                                                                                                                                                                     D-2 (rev. 06/18)

## 2020-05-27 NOTE — PROGRESS NOTES
"Subjective:     Jorden Moses is a 34 y.o. male who presents for Work-Related Injury (Laceration top of right hand.)      Date of injury 5/27/2020: Patient presents complaining of new workplace injury to right hand sustained while he was cleaning the blade of a piece of machinery at work.  Last tetanus was 5 years ago.  No previous injury.  No second job.  Patient states large skin flap.  Patient reports full range of motion of his hand, sensation is normal for him.  No other complaints.      PMH:   No pertinent past medical history to this problem  MEDS:  Medications were reviewed in EMR  ALLERGIES:  Allergies were reviewed in EMR  SOCHX:  Works as a  at printing shop.  FH:   No pertinent family history to this problem       Objective:     /60   Pulse 74   Temp 36.6 °C (97.9 °F) (Temporal)   Resp 12   Ht 1.676 m (5' 6\")   Wt 85.7 kg (189 lb)   SpO2 99%   BMI 30.51 kg/m²     Healthy well-appearing male in no acute distress.  He has a around 4 cm linear laceration/avulsion on the dorsum of the right hand on the ulnar aspect.  Flexion and extension of the digits are intact.  Nerve distribution intact.  No tendinous injury identified.  No foreign body identified.  No bony tenderness to palpation.  Radial, median, and ulnar nerve distributions intact.  Brisk cap refill in all fingers.          Assessment/Plan:       1. Laceration of right hand without foreign body, initial encounter  - Tdap =>6yo IM  - amoxicillin-clavulanate (AUGMENTIN) 875-125 MG Tab; Take 1 Tab by mouth 2 times a day for 7 days.  Dispense: 14 Tab; Refill: 0    • Released to Restricted Duty FROM 5/27/2020 TO 6/6/2020  • Limit use of right hand.  Wound is to remained covered with antibiotic ointment, nonadherent dressing, and protective covering such as Covan.  Wear gloves.  Take antibiotics.  Return if you see signs of infection.  • The wound was thoroughly cleaned, repaired using 6 sutures, tetanus was " updated, and the patient was given instructions to apply antibiotic ointment, nonadherent dressing, and then wrap with Coban.  He is to monitor closely for infection and take ora  • l antibiotics.    Differential diagnosis, natural history, supportive care, and indications for immediate follow-up discussed.

## 2020-06-08 ENCOUNTER — OCCUPATIONAL MEDICINE (OUTPATIENT)
Dept: URGENT CARE | Facility: PHYSICIAN GROUP | Age: 35
End: 2020-06-08
Payer: OTHER MISCELLANEOUS

## 2020-06-08 VITALS
DIASTOLIC BLOOD PRESSURE: 72 MMHG | RESPIRATION RATE: 16 BRPM | TEMPERATURE: 97.9 F | HEIGHT: 66 IN | SYSTOLIC BLOOD PRESSURE: 118 MMHG | OXYGEN SATURATION: 96 % | BODY MASS INDEX: 30.37 KG/M2 | HEART RATE: 78 BPM | WEIGHT: 189 LBS

## 2020-06-08 DIAGNOSIS — S61.411D LACERATION OF RIGHT HAND WITHOUT FOREIGN BODY, SUBSEQUENT ENCOUNTER: ICD-10-CM

## 2020-06-08 PROCEDURE — 99213 OFFICE O/P EST LOW 20 MIN: CPT | Performed by: PHYSICIAN ASSISTANT

## 2020-06-08 ASSESSMENT — ENCOUNTER SYMPTOMS
TINGLING: 0
FEVER: 0
SENSORY CHANGE: 0
FOCAL WEAKNESS: 0
CHILLS: 0

## 2020-06-08 ASSESSMENT — FIBROSIS 4 INDEX: FIB4 SCORE: 0.64

## 2020-06-08 NOTE — PROGRESS NOTES
"Subjective:      Jorden Moses is a 34 y.o. male who presents with Laceration (R hand, hurts when moving finger, WC follow up )    HPI  Date of injury 5/27/2020: Patient presents for follow up appointment after right hand laceration while he was cleaning a blade of a piece of machinery at work. He had laceration repair on 05/27/20. Tdap was updated last visit. Placed on Augmentin last visit. Admits missing 2 days of antibiotic therapy. He has today and tomorrow left. Reports very mild pain over the laceration with full flexion.  No previous injury.  No second job.   Patient reports full range of motion of his hand, sensation is normal for him.  No other complaints.           Review of Systems   Constitutional: Negative for chills and fever.   Skin:        Sutured hand laceration    Neurological: Negative for tingling, sensory change and focal weakness.          Objective:     /72 (BP Location: Right arm, Patient Position: Sitting, BP Cuff Size: Adult)   Pulse 78   Temp 36.6 °C (97.9 °F) (Tympanic)   Resp 16   Ht 1.676 m (5' 6\")   Wt 85.7 kg (189 lb)   SpO2 96%   BMI 30.51 kg/m²      Physical Exam  Vitals signs reviewed.   Constitutional:       General: He is not in acute distress.     Appearance: Normal appearance. He is not ill-appearing or toxic-appearing.   Eyes:      Conjunctiva/sclera: Conjunctivae normal.      Pupils: Pupils are equal, round, and reactive to light.   Neurological:      General: No focal deficit present.      Mental Status: He is alert and oriented to person, place, and time.   Psychiatric:         Mood and Affect: Mood normal.         Behavior: Behavior normal.         Healthy well-appearing. no acute distress.    Right Hand: Healed 4 cm linear sutured laceration on the dorsum of the right hand on the ulnar aspect. There is no surrounding erythema, edema, warmth, or discharge.  Flexion and extension of the digits are intact.  Nerve distribution intact.  No bony " tenderness to palpation or crepitus.  Radial, median, and ulnar nerve distributions intact.  Brisk cap refill in all fingers.       Past Medical History:   Diagnosis Date   • Open wound of finger 4/6/2015    History reviewed. No pertinent surgical history.   Social History     Socioeconomic History   • Marital status:      Spouse name: Not on file   • Number of children: Not on file   • Years of education: Not on file   • Highest education level: Not on file   Occupational History   • Not on file   Social Needs   • Financial resource strain: Not on file   • Food insecurity     Worry: Not on file     Inability: Not on file   • Transportation needs     Medical: Not on file     Non-medical: Not on file   Tobacco Use   • Smoking status: Former Smoker   • Smokeless tobacco: Never Used   • Tobacco comment: quit last month    Substance and Sexual Activity   • Alcohol use: Yes     Comment: occasionally   • Drug use: No   • Sexual activity: Not on file   Lifestyle   • Physical activity     Days per week: Not on file     Minutes per session: Not on file   • Stress: Not on file   Relationships   • Social connections     Talks on phone: Not on file     Gets together: Not on file     Attends [employer] service: Not on file     Active member of club or organization: Not on file     Attends meetings of clubs or organizations: Not on file     Relationship status: Not on file   • Intimate partner violence     Fear of current or ex partner: Not on file     Emotionally abused: Not on file     Physically abused: Not on file     Forced sexual activity: Not on file   Other Topics Concern   • Not on file   Social History Narrative   • Not on file    Shrimp (diagnostic)    Assessment/Plan:       1. Laceration of right hand without foreign body, subsequent encounter    Plan: Healed laceration. Six Sutures removed. No complications. Patient tolerated well.   Small amount of Dermabond applied. 3 small steri-strips applied.   Finish  antibiotics.   Keep area dry for 24 hours.   Light soap and water.   Return if any warmth, redness, increased pain, swelling, discharge, fever, chills, numbness, tingling, weakness   Released MMI and Full Duty.     Supportive care, differential diagnoses, and indications for immediate follow-up discussed with patient.    Pathogenesis of diagnosis discussed including typical length and natural progression. Patient expresses understanding and agrees to plan.    Please note that this dictation was created using voice recognition software. I have made every reasonable attempt to correct obvious errors, but I expect that there are errors of grammar and possibly content that I did not discover before finalizing the note.

## 2020-06-08 NOTE — LETTER
[facility]  [address] [place] [address]. 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    Medical Information   Related to Industrial Injury? Yes    Subjective Complaints:  Date of injury 5/27/2020: Patient presents for follow up appointment after right hand laceration while he was cleaning a blade of a piece of machinery at work. He had laceration repair on 05/27/20. Tdap was updated last visit. Placed on Augmentin last visit. Admits missing 2 days of antibiotic therapy. He has today and tomorrow left. Reports very mild pain over the laceration with full flexion.  No previous injury.  No second job.   Patient reports full range of motion of his hand, sensation is normal for him.  No other complaints.     Objective Findings: Healthy well-appearing. no acute distress.    Right Hand: Healed 4 cm linear sutured laceration on the dorsum of the right hand on the ulnar aspect. There is no surrounding erythema, edema, warmth, or discharge.  Flexion and extension of the digits are intact.  Nerve distribution intact.  No bony tenderness to palpation or crepitus.  Radial, median, and ulnar nerve distributions intact.  Brisk cap refill in all fingers.   Pre-Existing Condition(s):     Assessment:   Condition Improved    Status: Discharged /  MMI  Permanent Disability:No    Plan:      Diagnostics:      Comments:  Plan: Healed laceration. Six Sutures removed. No complications. Patient tolerated well.   Small  amount of Dermabond applied. 3 small steri-strips applied.   Finish antibiotics.   Keep area dry for 24 hours.   Light soap and water.   Return if any war  mth, redness, increased pain, swelling, discharge, fever, chills, numbness, tingling, weakness  Released MMI and Full Duty.     Disability Information   Status: Released to Full Duty    From:     Through:   Restrictions are:     Physical Restrictions   Sitting:    Standing:    Stooping:    Bending:      Squatting:    Walking:    Climbing:    Pushing:      Pulling:    Other:    Reaching Above Shoulder (L):   Reaching Above Shoulder (R):       Reaching Below Shoulder (L):    Reaching Below Shoulder (R):      Not to exceed Weight Limits   Carrying(hrs):   Weight Limit(lb):   Lifting(hrs):   Weight  Limit(lb):     Comments:      Repetitive Actions   Hands: i.e. Fine Manipulations from Grasping:     Feet: i.e. Operating Foot Controls:     Driving / Operate Machinery:     Physician Name: Smitha Trejo P.A.-C. Physician Signature: SMITHA Khan P.A.-C. e-Signature: Dr. Leonardo Miller, Medical Director   Clinic Name / Location: [address]  [place] [address] Clinic Phone Number: Dept: [phone]   Appointment Time: 8:00 Am Visit Start Time: 8:15 AM   Check-In Time:  8:09 Am Visit Discharge Time:  8:56AM   Original-Treating Physician or Chiropractor    Page 2-Insurer/TPA    Page 3-Employer    Page 4-Employee